# Patient Record
Sex: MALE | Race: OTHER | Employment: OTHER | URBAN - METROPOLITAN AREA
[De-identification: names, ages, dates, MRNs, and addresses within clinical notes are randomized per-mention and may not be internally consistent; named-entity substitution may affect disease eponyms.]

---

## 2024-03-15 ENCOUNTER — OFFICE VISIT (OUTPATIENT)
Dept: ORTHOPEDIC SURGERY | Age: 79
End: 2024-03-15

## 2024-03-15 VITALS — HEIGHT: 78 IN | BODY MASS INDEX: 25.8 KG/M2 | WEIGHT: 223 LBS

## 2024-03-15 DIAGNOSIS — Z96.651 HX OF TOTAL KNEE ARTHROPLASTY, RIGHT: Primary | ICD-10-CM

## 2024-03-15 DIAGNOSIS — T84.82XA ARTHROFIBROSIS OF TOTAL KNEE ARTHROPLASTY, INITIAL ENCOUNTER (HCC): ICD-10-CM

## 2024-03-15 RX ORDER — SPIRONOLACTONE 25 MG/1
50 TABLET ORAL DAILY
COMMUNITY
Start: 2021-04-20

## 2024-03-15 RX ORDER — CLOPIDOGREL BISULFATE 75 MG/1
75 TABLET ORAL DAILY
COMMUNITY
Start: 2023-07-10

## 2024-03-15 RX ORDER — CARVEDILOL 6.25 MG/1
6.25 TABLET ORAL 2 TIMES DAILY
COMMUNITY
Start: 2023-01-03

## 2024-03-15 RX ORDER — ATORVASTATIN CALCIUM 80 MG/1
80 TABLET, FILM COATED ORAL
COMMUNITY
Start: 2021-02-12

## 2024-03-15 RX ORDER — FUROSEMIDE 20 MG/1
20 TABLET ORAL DAILY
COMMUNITY
Start: 2024-03-08

## 2024-03-15 NOTE — PROGRESS NOTES
Dr Jesus Wright      Date /Time 3/15/2024       2:35 PM EDT  Name Jonathan Flannery             1945   Location  [facility]  MRN [id number]                Chief Complaint   Patient presents with    Knee Pain     N Right TKA 01/25/2023/ Poly Swap 05/24/2023        History of Present Illness  Jonathan Flannery is a 78 y.o. male who presents with  right knee pain, .    Sent in consultation by Herber Mondragon MD, .    He played basketball in his early years and was quite active.  He remains active as of now.  Injury Mechanism:  none.  Worker's Comp. & legal issues:   none.  Previous Treatments: Ice, Heat, and NSAIDs    Patient presents the office today for new problem.  Patient here with a chief complaint of right knee pain.  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  Surgeries were performed by Dr. August.  Patient continues to complain of ambulation and range of motion difficulties.  He states he is comfortable in his midportion of range of motion and normally just has pain at the endpoints.    He has had cardiac bypass surgery for coronary artery disease and remains on Plavix for many years.  He has stopped aspirin in the interim.  He has an active cardiac evaluation pending.    Past History  No past medical history on file.  No past surgical history on file.  Social History     Tobacco Use    Smoking status: Former     Types: Cigarettes    Smokeless tobacco: Not on file   Substance Use Topics    Alcohol use: Not on file      Current Outpatient Medications on File Prior to Visit   Medication Sig Dispense Refill    atorvastatin (LIPITOR) 80 MG tablet 1 tablet      carvedilol (COREG) 6.25 MG tablet Take 1 tablet by mouth 2 times daily      clopidogrel (PLAVIX) 75 MG tablet Take 1 tablet by mouth daily      spironolactone (ALDACTONE) 25 MG tablet Take 2 tablets by mouth daily

## 2024-04-09 ENCOUNTER — OFFICE VISIT (OUTPATIENT)
Dept: ORTHOPEDIC SURGERY | Age: 79
End: 2024-04-09
Payer: OTHER GOVERNMENT

## 2024-04-09 VITALS — BODY MASS INDEX: 25.8 KG/M2 | WEIGHT: 223 LBS | HEIGHT: 78 IN

## 2024-04-09 DIAGNOSIS — T84.82XA ARTHROFIBROSIS OF TOTAL KNEE ARTHROPLASTY, INITIAL ENCOUNTER (HCC): ICD-10-CM

## 2024-04-09 DIAGNOSIS — M24.661 ARTHROFIBROSIS OF KNEE JOINT, RIGHT: Primary | ICD-10-CM

## 2024-04-09 PROCEDURE — 99203 OFFICE O/P NEW LOW 30 MIN: CPT | Performed by: ORTHOPAEDIC SURGERY

## 2024-04-09 PROCEDURE — 1123F ACP DISCUSS/DSCN MKR DOCD: CPT | Performed by: ORTHOPAEDIC SURGERY

## 2024-04-09 NOTE — PROGRESS NOTES
Date:  2024    Name:  Jonathan Flannery  Address:  [address] [place] [id number]    :  1945      Age:   78 y.o.        Chief Complaint    Knee Pain (Ck rt knee)      History of Present Illness:  Jonathan Flannery is a 78 y.o. male who presents for evaluation of right knee pain.  Patient had a R TKA by Dr. August on 2023.  He then underwent open scar resection and poly ethylene swap on 2023 due to his restricted motion.  Patient reports he has had r issues in his knee ever since his surgery.  He states he does not have much pain in his knee but he has always been swollen and his extension has been limited.  Ascending and descending stairs have been difficult due to his lack of extension and he states he has required the use of a cane over the last several months.  He states he has never undergone infection workup but denies any redness, erythema, fevers or chills.  He denies ever being told that he has a metal allergy.  He is unhappy with the function of his right knee and is hopeful that we will have an answer for him.  Of note, he does have a history of diabetes as well as MI and underwent a CABG X4 within the last several months.  Patient also has a history of left hip fracture as a child and states that he wears a three-quarter inch lift in his left shoe    The patient denies any new injury.  The patient denies any catching, giving way, joint locking, numbness, paresthesias, or weakness.        Pain Assessment  Location of Pain: Knee  Location Modifiers: Right  Severity of Pain: 2  Quality of Pain: Locking  Duration of Pain: Persistent  Frequency of Pain: Constant  Aggravating Factors: Bending, Stretching, Straightening, Stairs  Limiting Behavior: Yes  Relieving Factors: Rest  Result of Injury: No  Work-Related Injury: No  Are there other pain locations you wish to document?: No    No past medical history on file.     No past surgical history on file.    No family history on

## 2024-04-15 ENCOUNTER — HOSPITAL ENCOUNTER (OUTPATIENT)
Dept: PHYSICAL THERAPY | Age: 79
Setting detail: THERAPIES SERIES
Discharge: HOME | End: 2024-04-15

## 2024-04-15 NOTE — PLAN OF CARE
surgical candidate.               Test used Initial score  4/15/24 04/15/2024   Pain Summary VAS ***    Functional questionnaire {outcome measures:66719} ***    Other:              Pain:  Pain location: ***  Patient describes pain to be {descriptor:30375}  Pain decreases with: {Easing Factors:55960}  Pain increases with: {PROVOKING FACTORS:11705}     Relevant Medical History: ***    Living status: ***    Occupation/School:  Work/School Status: {Work/School status:21541}  Job Duties/Demands: {Job Duties/Demands:52976}    Sport/ Recreation/ Leisure/ Hobbies: ***    Review Of Systems (ROS):  [x] Performed Review of systems (Integumentary, CardioPulmonary, Neurological) by intake and observation. Intake form has been scanned into medical record. Patient has been instructed to contact their primary care physician regarding ROS issues if not already being addressed at this time.    [x] Patient history, allergies, meds reviewed. Medical chart reviewed. See intake form.     Co-morbidities/Complexities (which will affect course of rehabilitation):  []None        Arthritic conditions  [] Rheumatoid arthritis (M05.9)  [x] Osteoarthritis (M19.91)  [] Gout        Neurological conditions  [] Prior Stroke (I69.30)  [] Parkinson's (G20)  [] Encephalopathy (G93.40)  [] Multiple Sclerosis (G35)  [] Post-polio (G14)  [] Spinal cord injury (SCI)  [] Traumatic brain injury (TBI)  [] ALS    Gastrointestinal conditions   [] Constipation (K59.00)  [] Chron's/ulcerative colitis    Endocrine conditions   [] Hypothyroid (E03.9)  [] Hyperthyroid [] Hx of COVID    Musculoskeletal conditions  [] Disc pathology   [] Congenital spine pathologies   [] Osteoporosis (M81.8)  [] Osteopenia (M85.8)  [] Scoliosis    Cardio/Pulmonary conditions  [] Asthma (J45)  [] Coughing   [] COPD (J44.9)  [] CHF  [] A-fib          Rheumatological conditions  [] Fibromyalgia (M79.7)  [] Lupus  [] Sjogrens  [] Ankylosing spondylitis  [] Other autoimmune       Metabolic 
13:20-13:45; chart reviewed, ok to treat by Occupational Therapist as confirmed by RN Roslyn, Pt received semifowler +RUE heplock +right shin dressing in NAD. Pt denied pain at rest and in agreement with OT IE.

## 2024-04-23 ENCOUNTER — APPOINTMENT (OUTPATIENT)
Dept: PHYSICAL THERAPY | Age: 79
End: 2024-04-23
Payer: OTHER GOVERNMENT

## 2024-04-30 ENCOUNTER — HOSPITAL ENCOUNTER (OUTPATIENT)
Dept: PHYSICAL THERAPY | Age: 79
Setting detail: THERAPIES SERIES
Discharge: HOME | End: 2024-04-30
Payer: OTHER GOVERNMENT

## 2024-04-30 DIAGNOSIS — M24.661 ARTHROFIBROSIS OF KNEE JOINT, RIGHT: ICD-10-CM

## 2024-04-30 DIAGNOSIS — M25.469 EDEMA OF KNEE: ICD-10-CM

## 2024-04-30 DIAGNOSIS — R26.9 GAIT ABNORMALITY: ICD-10-CM

## 2024-04-30 DIAGNOSIS — R29.898 DECREASED STRENGTH OF LOWER EXTREMITY: ICD-10-CM

## 2024-04-30 DIAGNOSIS — M25.561 RIGHT KNEE PAIN, UNSPECIFIED CHRONICITY: Primary | ICD-10-CM

## 2024-04-30 PROCEDURE — 97161 PT EVAL LOW COMPLEX 20 MIN: CPT | Performed by: PHYSICAL THERAPIST

## 2024-04-30 PROCEDURE — 97016 VASOPNEUMATIC DEVICE THERAPY: CPT | Performed by: PHYSICAL THERAPIST

## 2024-04-30 PROCEDURE — 97110 THERAPEUTIC EXERCISES: CPT | Performed by: PHYSICAL THERAPIST

## 2024-04-30 PROCEDURE — 97140 MANUAL THERAPY 1/> REGIONS: CPT | Performed by: PHYSICAL THERAPIST

## 2024-04-30 NOTE — PLAN OF CARE
normalization of ambulation patterns and AD training.   Manual Therapy (33765) as indicated to include: Passive Range of Motion, Gr I-IV mobilizations, and Soft Tissue Mobilization  Modalities as needed that may include: Cryotherapy, Electrical Stimulation, Biofeedback, Thermal Agents, and Vasoneumatic Compression  Patient education on joint protection, activity modification, and progression of HEP    Plan: POC initiated as per evaluation    Electronically Signed by Fidel Larios, PT  Date: 04/30/2024     Note: Portions of this note have been templated and/or copied from initial evaluation, reassessments and prior notes for documentation efficiency.    Note: If patient does not return for scheduled/recommended follow up visits, this note will serve as a discharge from care along with the most recent update on progress.

## 2024-05-02 ENCOUNTER — APPOINTMENT (OUTPATIENT)
Dept: PHYSICAL THERAPY | Age: 79
End: 2024-05-02
Payer: OTHER GOVERNMENT

## 2024-05-07 ENCOUNTER — HOSPITAL ENCOUNTER (OUTPATIENT)
Dept: PHYSICAL THERAPY | Age: 79
Setting detail: THERAPIES SERIES
Discharge: HOME | End: 2024-05-07
Payer: OTHER GOVERNMENT

## 2024-05-07 PROCEDURE — 97140 MANUAL THERAPY 1/> REGIONS: CPT | Performed by: PHYSICAL THERAPY ASSISTANT

## 2024-05-07 PROCEDURE — 97110 THERAPEUTIC EXERCISES: CPT | Performed by: PHYSICAL THERAPY ASSISTANT

## 2024-05-07 NOTE — FLOWSHEET NOTE
[facility] [address], [address], [place] [address] office: [phone] fax: [phone]        Physical Therapy: TREATMENT/PROGRESS NOTE   Patient: Jonathan Flannery (78 y.o. male)   Examination Date: 2024   :  1945 MRN: [id number]   Visit #: 2   Insurance Allowable Auth Needed   mn []Yes    [x]No    Insurance: Payor: [employer] / Plan: [employer] / Product Type: *No Product type* /   Insurance ID: [id number] - (Other)  Secondary Insurance (if applicable):    Treatment Diagnosis:     ICD-10-CM    1. Right knee pain, unspecified chronicity  M25.561       2. Arthrofibrosis of knee joint, right  M24.661       3. Edema of knee  M25.469       4. Gait abnormality  R26.9       5. Decreased strength of lower extremity  R29.898          Medical Diagnosis:  No admission diagnoses are documented for this encounter.   Referring Physician: Davy Dillon MD  PCP: Herber Mondragon MD     Plan of care signed (Y/N):     Date of Patient follow up with Physician:      Progress Report/POC: NO  POC update due: (10 visits /OR AUTH LIMITS, whichever is less)  2024                                             Precautions/ Contra-indications:           Latex allergy:  NO  Pacemaker:    YES-defibrillator   Contraindications for Manipulation: NA  Date of Surgery:   Other:    Red Flags:  None    C-SSRS Triggered by Intake questionnaire:   Patient answered 'NO' to both behavioral questions on intake.  No further screening warranted    Preferred Language for Healthcare:   [x] English       [] other:    SUBJECTIVE EXAMINATION     Patient stated complaint: Pt states knee feels about the same. Realizes that it will be a lengthy process and not expecting quick changes. Did purchase ankle weights for use with hep.       Patient presents to clinic today for evaluation of right knee pain.  Patient had a R TKA by Dr. August on 2023.  He then underwent

## 2024-05-09 ENCOUNTER — HOSPITAL ENCOUNTER (OUTPATIENT)
Dept: PHYSICAL THERAPY | Age: 79
Setting detail: THERAPIES SERIES
Discharge: HOME | End: 2024-05-09
Payer: OTHER GOVERNMENT

## 2024-05-09 PROCEDURE — 97140 MANUAL THERAPY 1/> REGIONS: CPT | Performed by: PHYSICAL THERAPY ASSISTANT

## 2024-05-09 PROCEDURE — 97110 THERAPEUTIC EXERCISES: CPT | Performed by: PHYSICAL THERAPY ASSISTANT

## 2024-05-09 NOTE — FLOWSHEET NOTE
MANUAL THERAPY -  Manual therapy techniques, 1 or more regions, each 15 minutes (Mobilization/manipulation, manual lymphatic drainage, manual traction) for the purpose of modulating pain, promoting relaxation,  increasing ROM, reducing/eliminating soft tissue swelling/inflammation/restriction, improving soft tissue extensibility and allowing for proper ROM for normal function with self care, mobility, lifting and ambulation    GOALS     Patient stated goal: Restore knee extension range of motion to a function range  [] Progressing: [] Met: [] Not Met: [] Adjusted    Therapist goals for Patient:   Short Term Goals: To be achieved in: 2 weeks  1. Independent in HEP and progression per patient tolerance, in order to prevent re-injury.   [] Progressing: [] Met: [] Not Met: [] Adjusted  2. Patient will have a decrease in pain to <0/10 to facilitate improvement in movement, function, and ADLs as indicated by Functional Deficits.  [] Progressing: [] Met: [] Not Met: [] Adjusted    Long Term Goals: To be achieved in: 6 weeks  1. Disability index score of 30% or less for the LEFS to assist with reaching prior level of function with activities such as managing stairs.  [] Progressing: [] Met: [] Not Met: [] Adjusted  2. Patient will demonstrate increased AROM of -10 to 120 without pain to allow for proper joint functioning to enable patient to ambulate with normal gait.   [] Progressing: [] Met: [] Not Met: [] Adjusted  3. Patient will demonstrate increased Strength of quads/hamstring/hip flexors/abd to at least 4+/5 throughout without pain to allow for proper functional mobility to enable patient to return to managing stairs with normal gait.   [] Progressing: [] Met: [] Not Met: [] Adjusted  4. Patient will return to donning/doffing socks/shoes without increased symptoms or restriction.  [] Progressing: [] Met: [] Not Met: [] Adjusted  5. Patient will be able to manage a flight of stairs with normal gait without pain or

## 2024-05-14 ENCOUNTER — HOSPITAL ENCOUNTER (OUTPATIENT)
Dept: PHYSICAL THERAPY | Age: 79
Setting detail: THERAPIES SERIES
Discharge: HOME | End: 2024-05-14
Payer: OTHER GOVERNMENT

## 2024-05-14 PROCEDURE — 97140 MANUAL THERAPY 1/> REGIONS: CPT | Performed by: PHYSICAL THERAPIST

## 2024-05-14 PROCEDURE — 97110 THERAPEUTIC EXERCISES: CPT | Performed by: PHYSICAL THERAPIST

## 2024-05-14 NOTE — FLOWSHEET NOTE
increase flexibility, following either an injury or surgery.   (26053) HOME EXERCISE PROGRAM - Reviewed/Progressed HEP activities related to strengthening, flexibility, endurance, ROM performed to prevent loss of range of motion, maintain or improve muscular strength or increase flexibility, following either an injury or surgery.  (04395) MANUAL THERAPY -  Manual therapy techniques, 1 or more regions, each 15 minutes (Mobilization/manipulation, manual lymphatic drainage, manual traction) for the purpose of modulating pain, promoting relaxation,  increasing ROM, reducing/eliminating soft tissue swelling/inflammation/restriction, improving soft tissue extensibility and allowing for proper ROM for normal function with self care, mobility, lifting and ambulation    GOALS     Patient stated goal: Restore knee extension range of motion to a function range  [] Progressing: [] Met: [] Not Met: [] Adjusted    Therapist goals for Patient:   Short Term Goals: To be achieved in: 2 weeks  1. Independent in HEP and progression per patient tolerance, in order to prevent re-injury.   [] Progressing: [] Met: [] Not Met: [] Adjusted  2. Patient will have a decrease in pain to <0/10 to facilitate improvement in movement, function, and ADLs as indicated by Functional Deficits.  [] Progressing: [] Met: [] Not Met: [] Adjusted    Long Term Goals: To be achieved in: 6 weeks  1. Disability index score of 30% or less for the LEFS to assist with reaching prior level of function with activities such as managing stairs.  [] Progressing: [] Met: [] Not Met: [] Adjusted  2. Patient will demonstrate increased AROM of -10 to 120 without pain to allow for proper joint functioning to enable patient to ambulate with normal gait.   [] Progressing: [] Met: [] Not Met: [] Adjusted  3. Patient will demonstrate increased Strength of quads/hamstring/hip flexors/abd to at least 4+/5 throughout without pain to allow for proper functional mobility to enable